# Patient Record
Sex: FEMALE | Race: WHITE | Employment: FULL TIME | URBAN - METROPOLITAN AREA
[De-identification: names, ages, dates, MRNs, and addresses within clinical notes are randomized per-mention and may not be internally consistent; named-entity substitution may affect disease eponyms.]

---

## 2022-01-31 ENCOUNTER — OFFICE VISIT (OUTPATIENT)
Dept: INTERNAL MEDICINE CLINIC | Age: 42
End: 2022-01-31
Payer: COMMERCIAL

## 2022-01-31 VITALS
WEIGHT: 141 LBS | HEIGHT: 68 IN | SYSTOLIC BLOOD PRESSURE: 107 MMHG | BODY MASS INDEX: 21.37 KG/M2 | HEART RATE: 69 BPM | TEMPERATURE: 98.6 F | RESPIRATION RATE: 20 BRPM | OXYGEN SATURATION: 97 % | DIASTOLIC BLOOD PRESSURE: 64 MMHG

## 2022-01-31 DIAGNOSIS — Z13.5 SCREENING FOR EYE CONDITION: ICD-10-CM

## 2022-01-31 DIAGNOSIS — Z23 NEEDS FLU SHOT: ICD-10-CM

## 2022-01-31 DIAGNOSIS — Z00.00 ANNUAL PHYSICAL EXAM: ICD-10-CM

## 2022-01-31 DIAGNOSIS — M79.671 RIGHT FOOT PAIN: Primary | ICD-10-CM

## 2022-01-31 PROCEDURE — 99396 PREV VISIT EST AGE 40-64: CPT | Performed by: PHYSICIAN ASSISTANT

## 2022-01-31 PROCEDURE — 90471 IMMUNIZATION ADMIN: CPT | Performed by: PHYSICIAN ASSISTANT

## 2022-01-31 PROCEDURE — 90686 IIV4 VACC NO PRSV 0.5 ML IM: CPT | Performed by: PHYSICIAN ASSISTANT

## 2022-01-31 NOTE — PROGRESS NOTES
1. Have you been to the ER, urgent care clinic since your last visit? Hospitalized since your last visit? No    2. Have you seen or consulted any other health care providers outside of the [address] [place] [address] since your last visit? Include any pap smears or colon screening.  No    Health Maintenance Due   Topic Date Due    Hepatitis C Screening  Never done    COVID-19 Vaccine (1) Never done    DTaP/Tdap/Td series (1 - Tdap) Never done    Cervical cancer screen  Never done    Lipid Screen  Never done    Flu Vaccine (1) Never done     Patient here today to establish care, would like a physical.

## 2022-01-31 NOTE — PROGRESS NOTES
Jef Avila is a 42 y.o. female  Chief Complaint   Patient presents with    Establish Care     Visit Vitals  /64   Pulse 69   Temp 98.6 °F (37 °C) (Temporal)   Resp 20   Ht 5' 8\" (1.727 m)   Wt 141 lb (64 kg)   SpO2 97%   BMI 21.44 kg/m²      Health Maintenance Due   Topic Date Due    COVID-19 Vaccine (1) Never done    Cervical cancer screen  Never done    Lipid Screen  Never done    Flu Vaccine (1) Never done     Social History     Tobacco Use    Smoking status: Never Smoker    Smokeless tobacco: Never Used   Substance Use Topics    Alcohol use: Yes     Alcohol/week: 3.0 standard drinks     Types: 3 Standard drinks or equivalent per week     Family History   Problem Relation Age of Onset    Alcohol abuse Mother     High Cholesterol Father     Alcohol abuse Father     Colon Cancer Maternal Grandmother     Hypertension Paternal Grandmother     Colon Cancer Maternal Aunt      HPI  Pt presents for an Annual Physical. New patient. New to [facility]. Healthy adult. Works as an . Has 2 children: 8 yo boy, 17 month old girl. Still nursing daughter in the AM, during the day on weekends, and always before bed. Daughter with nanny during the work day. Male partner lives & works in [place]     Hx R foot bunion - painful daily. Review of Systems   Constitutional: Negative for fever and weight loss. HENT: Negative for congestion, hearing loss and sore throat. Eyes: Negative for blurred vision. Respiratory: Negative for cough and shortness of breath. Cardiovascular: Negative for chest pain, palpitations and leg swelling. Gastrointestinal: Negative for abdominal pain, blood in stool, constipation, diarrhea, heartburn, melena, nausea and vomiting. Genitourinary: Negative for dysuria, frequency, hematuria and urgency. Musculoskeletal: Negative for back pain, joint pain and neck pain.    Neurological: Negative for dizziness, seizures, loss of consciousness, weakness and headaches. Endo/Heme/Allergies: Negative for environmental allergies. Psychiatric/Behavioral: Negative for depression and substance abuse. The patient is not nervous/anxious and does not have insomnia. Physical Exam  Vitals and nursing note reviewed. Constitutional:       General: She is not in acute distress. Appearance: She is well-developed. HENT:      Head: Normocephalic and atraumatic. Right Ear: Tympanic membrane, ear canal and external ear normal.      Left Ear: Tympanic membrane, ear canal and external ear normal.   Eyes:      Conjunctiva/sclera: Conjunctivae normal.   Neck:      Thyroid: No thyromegaly. Vascular: No carotid bruit or JVD. Cardiovascular:      Rate and Rhythm: Normal rate and regular rhythm. Heart sounds: Normal heart sounds. Pulmonary:      Effort: Pulmonary effort is normal. No respiratory distress. Breath sounds: Normal breath sounds. Abdominal:      General: Bowel sounds are normal. There is no distension. Palpations: Abdomen is soft. There is no mass. Tenderness: There is no abdominal tenderness. There is no guarding or rebound. Musculoskeletal:      Cervical back: Neck supple. No muscular tenderness. Lymphadenopathy:      Cervical: No cervical adenopathy. Skin:     General: Skin is warm and dry. Neurological:      Mental Status: She is alert and oriented to person, place, and time. Psychiatric:         Mood and Affect: Mood normal.         Behavior: Behavior normal.         Thought Content: Thought content normal.         Judgment: Judgment normal.         Diagnoses and all orders for this visit:    1. Right foot pain  -     REFERRAL TO PODIATRY    2. Screening for eye condition  -     REFERRAL TO OPHTHALMOLOGY    3. Annual physical exam  -     CBC W/O DIFF; Future  -     METABOLIC PANEL, COMPREHENSIVE; Future  -     TSH 3RD GENERATION; Future  -     HEMOGLOBIN A1C WITH EAG; Future  -     LIPID PANEL;  Future  - URINALYSIS W/ REFLEX CULTURE; Future    4.  Needs flu shot  -     INFLUENZA VIRUS VAC QUAD,SPLIT,PRESV FREE SYRINGE IM

## 2022-01-31 NOTE — PATIENT INSTRUCTIONS
Nizoral Shampoo for scalp    Hydrocortisone Cream for Eczema patches. Vaccine Information Statement    Influenza (Flu) Vaccine (Inactivated or Recombinant): What You Need to Know    Many vaccine information statements are available in [place] and other languages. See www.immunize.org/vis. Hojas de información sobre vacunas están disponibles en español y en muchos otros idiomas. Visite www.immunize.org/vis. 1. Why get vaccinated? Influenza vaccine can prevent influenza (flu). Flu is a contagious disease that spreads around the [facility] every year, usually between October and May. Anyone can get the flu, but it is more dangerous for some people. Infants and young children, people 72 years and older, pregnant people, and people with certain health conditions or a weakened immune system are at greatest risk of flu complications. Pneumonia, bronchitis, sinus infections, and ear infections are examples of flu-related complications. If you have a medical condition, such as heart disease, cancer, or diabetes, flu can make it worse. Flu can cause fever and chills, sore throat, muscle aches, fatigue, cough, headache, and runny or stuffy nose. Some people may have vomiting and diarrhea, though this is more common in children than adults. In an average year, thousands of people in the [facility] die from flu, and many more are hospitalized. Flu vaccine prevents millions of illnesses and flu-related visits to the doctor each year. 2. Influenza vaccines     CDC recommends everyone 6 months and older get vaccinated every flu season. Children 6 months through 6years of age may need 2 doses during a single flu season. Everyone else needs only 1 dose each flu season. It takes about 2 weeks for protection to develop after vaccination. There are many flu viruses, and they are always changing.  Each year a new flu vaccine is made to protect against the influenza viruses believed to be likely to cause disease in the upcoming flu season. Even when the vaccine doesnt exactly match these viruses, it may still provide some protection. Influenza vaccine does not cause flu. Influenza vaccine may be given at the same time as other vaccines. 3. Talk with your health care provider    Tell your vaccination provider if the person getting the vaccine:   Has had an allergic reaction after a previous dose of influenza vaccine, or has any severe, life-threatening allergies    Has ever had Guillain-Barré Syndrome (also called GBS)    In some cases, your health care provider may decide to postpone influenza vaccination until a future visit. Influenza vaccine can be administered at any time during pregnancy. People who are or will be pregnant during influenza season should receive inactivated influenza vaccine. People with minor illnesses, such as a cold, may be vaccinated. People who are moderately or severely ill should usually wait until they recover before getting influenza vaccine. Your health care provider can give you more information. 4. Risks of a vaccine reaction     Soreness, redness, and swelling where the shot is given, fever, muscle aches, and headache can happen after influenza vaccination.  There may be a very small increased risk of Guillain-Barré Syndrome (GBS) after inactivated influenza vaccine (the flu shot). Selvin Power children who get the flu shot along with pneumococcal vaccine (PCV13) and/or DTaP vaccine at the same time might be slightly more likely to have a seizure caused by fever. Tell your health care provider if a child who is getting flu vaccine has ever had a seizure. People sometimes faint after medical procedures, including vaccination. Tell your provider if you feel dizzy or have vision changes or ringing in the ears. As with any medicine, there is a very remote chance of a vaccine causing a severe allergic reaction, other serious injury, or death.     5. What if there is a serious problem? An allergic reaction could occur after the vaccinated person leaves the clinic. If you see signs of a severe allergic reaction (hives, swelling of the face and throat, difficulty breathing, a fast heartbeat, dizziness, or weakness), call 9-1-1 and get the person to the nearest hospital.    For other signs that concern you, call your health care provider. Adverse reactions should be reported to the Vaccine Adverse Event Reporting System (VAERS). Your health care provider will usually file this report, or you can do it yourself. Visit the VAERS website at www.vaers. [facility].gov or call 7-359.816.9156. VAERS is only for reporting reactions, and VAERS staff members do not give medical advice. 6. The National Vaccine Injury Compensation Program    The [facility] Vaccine Injury Compensation Program (VICP) is a federal program that was created to compensate people who may have been injured by certain vaccines. Claims regarding alleged injury or death due to vaccination have a time limit for filing, which may be as short as two years. Visit the VICP website at www.[facility].gov/vaccinecompensation or call 7-979.703.4151 to learn about the program and about filing a claim. 7. How can I learn more?  Ask your health care provider.  Call your local or state health department.  Visit the website of the Food and Drug Administration (FDA) for vaccine package inserts and additional information at www.fda.gov/vaccines-blood-biologics/vaccines.  Contact the Centers for Disease Control and Prevention (CDC):  - Call 7-477.675.7562 (1-800-CDC-INFO) or  - Visit CDCs influenza website at www.cdc.gov/flu. Vaccine Information Statement   Inactivated Influenza Vaccine   8/6/2021  [address] [id number]-91   Department of Health and Human Services  Centers for Disease Control and Prevention    Office Use Only

## 2022-09-22 ENCOUNTER — OFFICE VISIT (OUTPATIENT)
Dept: INTERNAL MEDICINE CLINIC | Age: 42
End: 2022-09-22
Payer: COMMERCIAL

## 2022-09-22 VITALS
BODY MASS INDEX: 20.22 KG/M2 | RESPIRATION RATE: 16 BRPM | OXYGEN SATURATION: 97 % | WEIGHT: 133.4 LBS | HEIGHT: 68 IN | TEMPERATURE: 98.3 F | SYSTOLIC BLOOD PRESSURE: 99 MMHG | HEART RATE: 83 BPM | DIASTOLIC BLOOD PRESSURE: 64 MMHG

## 2022-09-22 DIAGNOSIS — R51.9 ACUTE NONINTRACTABLE HEADACHE, UNSPECIFIED HEADACHE TYPE: ICD-10-CM

## 2022-09-22 DIAGNOSIS — F41.9 ANXIETY: ICD-10-CM

## 2022-09-22 DIAGNOSIS — H53.8 BLURRED VISION, BILATERAL: ICD-10-CM

## 2022-09-22 DIAGNOSIS — Z13.5 SCREENING FOR EYE CONDITION: Primary | ICD-10-CM

## 2022-09-22 PROCEDURE — 99214 OFFICE O/P EST MOD 30 MIN: CPT | Performed by: NURSE PRACTITIONER

## 2022-09-22 RX ORDER — ESCITALOPRAM OXALATE 10 MG/1
10 TABLET ORAL DAILY
Qty: 30 TABLET | Refills: 0 | Status: SHIPPED | OUTPATIENT
Start: 2022-09-22 | End: 2022-10-14 | Stop reason: SDUPTHER

## 2022-09-22 NOTE — PROGRESS NOTES
HISTORY OF PRESENT ILLNESS  Arabella Seay is a 42 y.o. female. Patient is here with a   history of blurred vision and headache. Black spots. Difficult adjustment. This am. When looked at phone. Screen parts were black. Closed eyes. Later   continued. N ot sure which eye. Continued  for 4-5 hours. Has dull ache behind right eyes. Hurt to lay on  right side of face. Concerned because  Brother had a glioblastoma. Wears glasses and contacts. Eye appt tomorrow   Has not taken anything headache. Doesn't hacve headache. Has  severe anxiety. Zoloft GYN/  Weaned self off. Made her feel \"not right \" after a while. Considering restarting something  else. Headache  The history is provided by the Patient. This is a new problem. The current episode started 6 to 12 hours ago. The problem occurs rarely. The problem has been resolved. Associated symptoms include headaches. Nothing aggravates the symptoms. Blurred Vision   Associated symptoms include blurred vision. Pertinent negatives include no discharge, no double vision, no photophobia, no eye redness, no fever and no pain. History reviewed. No pertinent past medical history. History reviewed. No pertinent surgical history. Review of Systems   Constitutional:  Negative for fever and malaise/fatigue. HENT:  Negative for hearing loss. Eyes:  Positive for blurred vision. Negative for double vision, photophobia, pain, discharge and redness. Cardiovascular:  Negative for palpitations. Skin:  Negative for rash. Neurological:  Positive for headaches. Psychiatric/Behavioral:  The patient is nervous/anxious. All other systems reviewed and are negative. Physical Exam  Vitals and nursing note reviewed. Constitutional:       General: She is not in acute distress. Appearance: Normal appearance. She is normal weight. HENT:      Head: Normocephalic and atraumatic.    Eyes:      General: Lids are normal. Vision grossly intact. No visual field deficit. Extraocular Movements: Extraocular movements intact. Conjunctiva/sclera: Conjunctivae normal.      Comments: Visual fields full  No dysmetria  No nystagmus. No papilledema  Unable to fundoscopic exam due to pupil size   Cardiovascular:      Rate and Rhythm: Normal rate. Pulses: Normal pulses. Musculoskeletal:      Cervical back: Normal range of motion. Skin:     General: Skin is warm. Neurological:      General: No focal deficit present. Mental Status: She is alert. Psychiatric:         Attention and Perception: Attention normal.         Mood and Affect: Mood is anxious. Cognition and Memory: Cognition normal.     ASSESSMENT and PLAN    ICD-10-CM ICD-9-CM    1. Screening for eye condition  Z13.5 V80.2       2. Acute nonintractable headache, unspecified headache type  R51.9 784.0       3. Blurred vision, bilateral  H53.8 368.8       4. Anxiety  F41.9 300.00         Encounter Diagnoses   Name Primary? Screening for eye condition Yes    Acute nonintractable headache, unspecified headache type     Blurred vision, bilateral     Anxiety    Follow with eye doctor tomorrow as scheduled. Return  4 weeks for anxiety follow up.  Consider testing for ADHD  Medication as directed  Signed By: CHELO Quintana     September 22, 2022        Orders Placed This Encounter    escitalopram oxalate (LEXAPRO) 10 mg tablet

## 2022-09-22 NOTE — PROGRESS NOTES
Chief Complaint   Patient presents with    Headache     Started this morning 545am.    Blurred Vision     Persisted for several hours. Dull ache most of the day. Visit Vitals  BP 99/64 (BP 1 Location: Right upper arm, BP Patient Position: Sitting, BP Cuff Size: Adult)   Pulse 83   Temp 98.3 °F (36.8 °C) (Temporal)   Resp 16   Ht 5' 8\" (1.727 m)   Wt 133 lb 6.4 oz (60.5 kg)   LMP 09/06/2022   SpO2 97%   BMI 20.28 kg/m²        1. Have you been to the ER, urgent care clinic since your last visit? Hospitalized since your last visit? No    2. Have you seen or consulted any other health care providers outside of the [address] [place] [address] since your last visit? Include any pap smears or colon screening. No     Health Maintenance Due   Topic Date Due    COVID-19 Vaccine (1) Never done    Cervical cancer screen  Never done    Lipid Screen  Never done    Flu Vaccine (1) 08/01/2022        3 most recent PHQ Screens 9/22/2022   Little interest or pleasure in doing things Not at all   Feeling down, depressed, irritable, or hopeless Not at all   Total Score PHQ 2 0        No flowsheet data found.     Learning Assessment 1/31/2022   PRIMARY LEARNER Patient   HIGHEST LEVEL OF EDUCATION - PRIMARY LEARNER  [employer]   BARRIERS PRIMARY LEARNER NONE   CO-LEARNER CAREGIVER No   PRIMARY LANGUAGE ENGLISH   LEARNER PREFERENCE PRIMARY DEMONSTRATION   ANSWERED BY patient   RELATIONSHIP SELF

## 2022-10-17 RX ORDER — ESCITALOPRAM OXALATE 10 MG/1
10 TABLET ORAL DAILY
Qty: 30 TABLET | Refills: 0 | Status: SHIPPED | OUTPATIENT
Start: 2022-10-17 | End: 2022-10-21 | Stop reason: SDUPTHER

## 2022-10-21 RX ORDER — ESCITALOPRAM OXALATE 10 MG/1
10 TABLET ORAL DAILY
Qty: 30 TABLET | Refills: 0 | Status: SHIPPED | OUTPATIENT
Start: 2022-10-21 | End: 2022-11-20

## 2022-10-21 NOTE — TELEPHONE ENCOUNTER
Requested Prescriptions     Pending Prescriptions Disp Refills    escitalopram oxalate (LEXAPRO) 10 mg tablet 30 Tablet 0     Sig: Take 1 Tablet by mouth daily for 30 days.        No Known Allergies    Last visit with ordering provider: 9/22/22  Next visit with ordering provider: 83/10/03  Is order duplicate: yes, last signed four days ago for 30 pills, 90 day supply requested     Current Outpatient Medications   Medication Instructions    escitalopram oxalate (LEXAPRO) 10 mg, Oral, DAILY       Signed By: Vick Jiang     October 21, 2022

## 2022-11-16 NOTE — TELEPHONE ENCOUNTER
PCP: Ele Jaramillo PA-C    Last appt: 9/22/2022  No future appointments. Requested Prescriptions     Pending Prescriptions Disp Refills    escitalopram oxalate (LEXAPRO) 10 mg tablet 30 Tablet 0     Sig: Take 1 Tablet by mouth daily for 30 days.

## 2022-11-17 RX ORDER — ESCITALOPRAM OXALATE 10 MG/1
10 TABLET ORAL DAILY
Qty: 30 TABLET | Refills: 0 | OUTPATIENT
Start: 2022-11-17 | End: 2022-12-17

## 2022-11-18 ENCOUNTER — TELEPHONE (OUTPATIENT)
Dept: INTERNAL MEDICINE CLINIC | Age: 42
End: 2022-11-18

## 2022-11-18 NOTE — TELEPHONE ENCOUNTER
Patient states she did not request this medication and is not trying to schedule at this time. Advised her to let her pharmacy know to stop requesting it.

## 2022-11-18 NOTE — TELEPHONE ENCOUNTER
Informed pt that for any further refills an office visit is required. Pt verbalized understanding. She stated that she does not need the escitalopram at this time.